# Patient Record
Sex: MALE | URBAN - METROPOLITAN AREA
[De-identification: names, ages, dates, MRNs, and addresses within clinical notes are randomized per-mention and may not be internally consistent; named-entity substitution may affect disease eponyms.]

---

## 2024-03-05 ENCOUNTER — HOME VISIT (OUTPATIENT)
Dept: POST ACUTE CARE | Facility: EXTERNAL LOCATION | Age: 79
End: 2024-03-05
Payer: MEDICARE

## 2024-03-05 DIAGNOSIS — J43.8 OTHER EMPHYSEMA (MULTI): ICD-10-CM

## 2024-03-05 DIAGNOSIS — E03.8 OTHER SPECIFIED HYPOTHYROIDISM: ICD-10-CM

## 2024-03-05 DIAGNOSIS — I10 PRIMARY HYPERTENSION: ICD-10-CM

## 2024-03-05 DIAGNOSIS — I48.0 PAROXYSMAL ATRIAL FIBRILLATION (MULTI): ICD-10-CM

## 2024-03-05 DIAGNOSIS — E78.2 MODERATE MIXED HYPERLIPIDEMIA NOT REQUIRING STATIN THERAPY: ICD-10-CM

## 2024-03-05 DIAGNOSIS — I27.20 PULMONARY HYPERTENSION (MULTI): ICD-10-CM

## 2024-03-05 DIAGNOSIS — D50.8 IRON DEFICIENCY ANEMIA SECONDARY TO INADEQUATE DIETARY IRON INTAKE: ICD-10-CM

## 2024-03-05 DIAGNOSIS — I25.10 CORONARY ARTERY DISEASE INVOLVING NATIVE CORONARY ARTERY OF NATIVE HEART WITHOUT ANGINA PECTORIS: ICD-10-CM

## 2024-03-05 DIAGNOSIS — D68.59 THROMBOPHILIA (MULTI): ICD-10-CM

## 2024-03-05 DIAGNOSIS — F01.A3 MILD VASCULAR DEMENTIA WITH MOOD DISTURBANCE (MULTI): ICD-10-CM

## 2024-03-05 DIAGNOSIS — I70.0 ATHEROSCLEROSIS OF ABDOMINAL AORTA (CMS-HCC): Primary | ICD-10-CM

## 2024-03-05 DIAGNOSIS — G47.33 OBSTRUCTIVE SLEEP APNEA: ICD-10-CM

## 2024-03-05 PROCEDURE — 99345 HOME/RES VST NEW HIGH MDM 75: CPT | Performed by: INTERNAL MEDICINE

## 2024-03-10 VITALS — SYSTOLIC BLOOD PRESSURE: 122 MMHG | DIASTOLIC BLOOD PRESSURE: 58 MMHG | WEIGHT: 218 LBS | HEART RATE: 61 BPM

## 2024-03-10 PROBLEM — I70.0 ATHEROSCLEROSIS OF ABDOMINAL AORTA (CMS-HCC): Status: ACTIVE | Noted: 2024-03-10

## 2024-03-10 PROBLEM — E03.8 OTHER SPECIFIED HYPOTHYROIDISM: Status: ACTIVE | Noted: 2024-03-10

## 2024-03-10 PROBLEM — K21.9 GASTROESOPHAGEAL REFLUX DISEASE WITHOUT ESOPHAGITIS: Status: ACTIVE | Noted: 2024-03-10

## 2024-03-10 PROBLEM — I27.20 PULMONARY HYPERTENSION (MULTI): Status: ACTIVE | Noted: 2024-03-10

## 2024-03-10 PROBLEM — E78.2 MODERATE MIXED HYPERLIPIDEMIA NOT REQUIRING STATIN THERAPY: Status: ACTIVE | Noted: 2024-03-10

## 2024-03-10 PROBLEM — I48.0 PAROXYSMAL ATRIAL FIBRILLATION (MULTI): Status: ACTIVE | Noted: 2024-03-10

## 2024-03-10 PROBLEM — F41.9 ANXIETY: Status: RESOLVED | Noted: 2024-03-10 | Resolved: 2024-03-10

## 2024-03-10 PROBLEM — E11.51: Status: ACTIVE | Noted: 2024-03-10

## 2024-03-10 PROBLEM — I70.0: Status: ACTIVE | Noted: 2024-03-10

## 2024-03-10 PROBLEM — J43.8 OTHER EMPHYSEMA (MULTI): Status: ACTIVE | Noted: 2024-03-10

## 2024-03-10 PROBLEM — F01.A3 MILD VASCULAR DEMENTIA WITH MOOD DISTURBANCE (MULTI): Status: ACTIVE | Noted: 2024-03-10

## 2024-03-10 PROBLEM — G47.33 OBSTRUCTIVE SLEEP APNEA: Status: ACTIVE | Noted: 2024-03-10

## 2024-03-10 PROBLEM — D50.8 IRON DEFICIENCY ANEMIA SECONDARY TO INADEQUATE DIETARY IRON INTAKE: Status: ACTIVE | Noted: 2024-03-10

## 2024-03-10 PROBLEM — I70.0: Status: RESOLVED | Noted: 2024-03-10 | Resolved: 2024-03-10

## 2024-03-10 PROBLEM — I10 PRIMARY HYPERTENSION: Status: ACTIVE | Noted: 2024-03-10

## 2024-03-10 PROBLEM — F32.4 MAJOR DEPRESSIVE DISORDER WITH SINGLE EPISODE, IN PARTIAL REMISSION (CMS-HCC): Status: RESOLVED | Noted: 2024-03-10 | Resolved: 2024-03-10

## 2024-03-10 PROBLEM — F41.9 ANXIETY: Status: ACTIVE | Noted: 2024-03-10

## 2024-03-10 PROBLEM — F32.4 MAJOR DEPRESSIVE DISORDER WITH SINGLE EPISODE, IN PARTIAL REMISSION (CMS-HCC): Status: ACTIVE | Noted: 2024-03-10

## 2024-03-10 PROBLEM — N40.0 BENIGN PROSTATIC HYPERPLASIA WITHOUT LOWER URINARY TRACT SYMPTOMS: Status: ACTIVE | Noted: 2024-03-10

## 2024-03-10 PROBLEM — D68.59 THROMBOPHILIA (MULTI): Status: ACTIVE | Noted: 2024-03-10

## 2024-03-10 PROBLEM — I25.10 CORONARY ARTERY DISEASE INVOLVING NATIVE CORONARY ARTERY OF NATIVE HEART WITHOUT ANGINA PECTORIS: Status: ACTIVE | Noted: 2024-03-10

## 2024-03-10 PROBLEM — I48.20 CHRONIC ATRIAL FIBRILLATION (MULTI): Status: ACTIVE | Noted: 2024-03-10

## 2024-03-10 PROBLEM — E11.51: Status: RESOLVED | Noted: 2024-03-10 | Resolved: 2024-03-10

## 2024-03-10 ASSESSMENT — ENCOUNTER SYMPTOMS
DIARRHEA: 0
UNEXPECTED WEIGHT CHANGE: 0
DIZZINESS: 0
DIFFICULTY URINATING: 0
SEIZURES: 0
ABDOMINAL PAIN: 0
APNEA: 0
NAUSEA: 0
HEMATURIA: 0
POLYDIPSIA: 0
CONSTIPATION: 0
TROUBLE SWALLOWING: 0
WHEEZING: 0
AGITATION: 0
WEAKNESS: 0
APPETITE CHANGE: 0
ARTHRALGIAS: 1
BRUISES/BLEEDS EASILY: 0
CHOKING: 0
COUGH: 0
DYSPHORIC MOOD: 0
ACTIVITY CHANGE: 0
CONFUSION: 0
HEADACHES: 0
WOUND: 0
SLEEP DISTURBANCE: 0
PALPITATIONS: 0
FEVER: 0
SHORTNESS OF BREATH: 0

## 2024-03-10 NOTE — PROGRESS NOTES
"Initial Visit Domo Chen  1945 age 78     [facility]     Seen at home in [place] apartment 3/5/2024     Primary hospital [facility]   Sees numerous [facility] specialist  ENT for asymmetrical hearing loss  Ophthalmology  Cardiology  Pulmonary  Gerontology for memory clinic  Cc: establish care. Patient reports his plan is to continue primary care through [facility].  Family has requested through facility that primary care be established with me given ability to complete home visits at the facility          TODAY     No complaints     No concerns since admission     No nursing concerns     PMH     s/p bilateral rib fractures 8-9     Dextroscoliosis of the thoracic spine     Vit d deficiency     h/o rhabdo     Copd   CINTHYA  H/o colon ca s/p hemicolectomy w end to end anastamosis  Severe pulmonary hypertension  Chronic respiratory failure on 3L O2 - only wears qhs     Afib w secondary thrombophilia     Htn     Hld     Cardiomegaly     Atherosclerosis of the aorta     CAD     h/o colon ca     GERD     Hypothyroidsm     Major depression moderate recurrent     Generalized anxiety disorder     Vascular dementia     Anemia fe deficiency     B12 deficiency     BPH     Chronic allergic rhinitis     NKDA     Omeprazole     Mvi     Guaifenesin     Fe     Rosuvasatin     Tamsulosin     Metoprolol     Fluticasone-salmeterol     Xarelto     Fluticasone nasal     Umeclidinium bromide     Lasix 20     Cetirizine     Asa     B12     Vit D     Alb prn     Loperamide prn      SOC  NOK is son Lawrence  Recent issues with non adherence to meds at home  Previously living home alone  Quit tob (cig) occ cigar use  No etoh, \"but has some whiskey in my fridge for guests\"    ROS   Review of Systems   Constitutional:  Negative for activity change, appetite change, fever and unexpected weight change.   HENT:  Negative for congestion, dental problem and trouble swallowing.    Eyes:  Negative for visual disturbance. "   Respiratory:  Negative for apnea, cough, choking, shortness of breath and wheezing.         Chart hx w dx resp failure. He reports only wearing O2 at night.    Cardiovascular:  Negative for chest pain, palpitations and leg swelling.   Gastrointestinal:  Negative for abdominal pain, constipation, diarrhea and nausea.   Endocrine: Negative for cold intolerance, polydipsia and polyuria.   Genitourinary:  Negative for difficulty urinating and hematuria.   Musculoskeletal:  Positive for arthralgias and gait problem.        One leg shorter than the other after childhood traumatic injury     Skin:  Negative for rash and wound.   Allergic/Immunologic: Negative for environmental allergies and food allergies.   Neurological:  Negative for dizziness, seizures, syncope, weakness and headaches.   Hematological:  Does not bruise/bleed easily.   Psychiatric/Behavioral:  Negative for agitation, confusion, dysphoric mood and sleep disturbance.            Physical Exam  Vitals and nursing note reviewed.   Constitutional:       Appearance: Normal appearance.   HENT:      Head: Normocephalic and atraumatic.      Mouth/Throat:      Mouth: Mucous membranes are moist.      Pharynx: No oropharyngeal exudate.   Eyes:      General: No scleral icterus.     Extraocular Movements: Extraocular movements intact.      Pupils: Pupils are equal, round, and reactive to light.   Cardiovascular:      Rate and Rhythm: Normal rate and regular rhythm.      Pulses: Normal pulses.      Heart sounds: No murmur heard.  Pulmonary:      Effort: Pulmonary effort is normal.      Breath sounds: No wheezing.      Comments: Diminished but clear throughout  Abdominal:      General: Bowel sounds are normal.      Palpations: Abdomen is soft.      Tenderness: There is no abdominal tenderness.   Musculoskeletal:      Cervical back: Normal range of motion and neck supple.      Comments: 1+ edema to bilateral shins. Pitting. Skin thin atrophic hairless with venous stasis  dermatitis. Hypersensative. No open lesions bilateral feet. Ataxic gait with obvious limp   Skin:     General: Skin is warm and dry.   Neurological:      Mental Status: He is alert and oriented to person, place, and time.   Psychiatric:         Mood and Affect: Mood normal.         Speech: Speech is tangential.         Behavior: Behavior normal.      Comments: Repetitive in history provided. Needed to redirect often to answer question at hand. Pleasant and cooperative. Struggled to  on social cues; continued to tell story of his motorcycle as I had ended visit and was walking away...              LABS   Reviewed in clinisync  February 16, 2024  MRI head done for asymmetric hearing loss  Complete opacification of left maxillary sinus  Scattered T2 hyperintensities in the cerebral white matter consistent with mild chronic microvascular angiopathy and mild brain parenchymal volume loss    January 17, 2024  WBC 3  Hemoglobin 10.7  Platelet 113  Glucose 97  Sodium 135  BUN 13  EGFR 91 creatinine 0.81  Magnesium 1.9  Phos 3.1  January 15, 2024 chest x-ray  No cardiopulmonary disease  February 2021 hemoglobin A1c 5.2  July 2022 PSA 0.64  January 2024 CT chest abdomen pelvis  Ascending aortic dilatation 4.1 cm  Pulmonary trunk dilatation 3.4 cm  Centrilobular emphysema  Gallbladder surgically absent  Postsurgical changes consistent with right hemicolectomy with ileocolic anastomosis  Gynecomastia  January 14, 2024 echocardiogram  Normal LV systolic function  EF 60%  Mild to moderate aortic fibrocalcific nonstenotic changes  Moderate pulmonary hypertension            A/P    78-year-old white male recently admitted to [facility] for assistance with medical supervision and medication management.  Appears medically stable and symptom-free at this time    HEENT  1 asymmetric hearing loss with recent imaging showing chronic mastoiditis.  Follow-up with ENT.  Continue nasal  fluticasone    Cardiovascular/pulmonary  1 atherosclerosis of abdominal aorta with known aneurysm.  Stable continue risk factor modification due for repeat imaging in 12 months (January 2025) if clinically appropriate  2 atrial fibrillation paroxysmal.  Clinically sinus during today's exam.  Rate controlled.  Tolerating anticoagulation.  Monitor for signs and symptoms of bleeding.  Continue metoprolol for rate control and Xarelto for secondary thrombophilia associated with atrial fibrillation.  [facility] as indicated  3 hypertension stable with current medications (he is on Toprol for rate control and Lasix for pulmonary hypertension but this combination has provided good control of his blood pressure) continue  4.  Mixed hyperlipidemia stable with rosuvastatin.  Tolerating well.  Continue January 2024 LDL 34.  HDL 26.  Continue  5 coronary artery disease stable chest pain-free.  Continue risk factor modification  6.  Obstructive sleep apnea (untreated currently now only on nocturnal O2) with moderate pulmonary hypertension.  Continue nocturnal O2.  Continue Lasix and monitor fluid status.  Has follow-up with pulmonary May 17.  Reviewed [facility] pulmonary notes.  Appears he was referred for CPAP machine roughly 2014 and declined treatment at that time.  Now agreed to titration study.  Does not appear to have been completed.  Follow-up pulmonary.  Consider repeat in-home testing with consideration of AutoPap if appropriate.    7 chronic obstructive pulmonary disease.  June 2021 PFTs with FEV1 to FVC ratio 54% predicted.  Symptoms reported to be well-controlled at this time continue guaifenesin, Advair, umeclidinium.    Endocrine  1 hypothyroidism clinically euthyroid with current replacement.  Last TSH 0.89 December 2022.  Repeat  2 chart includes diagnosis of diabetes mellitus with atherosclerosis.  Upon review of [facility] chart I see no hemoglobin A1c which would qualify him for this diagnosis.  He is  not currently on any medications for diabetes.  Will remove from diagnosis list and monitor hemoglobin A1c    Neuro/psych  1 vascular dementia.  Now followed by [facility] memory clinic.  Known change in ADLs with son now managing health concerns and finances. Noted need for admission to higher level of care in order to meet care needs.  Of note there are some mention of diagnosis of depression and anxiety throughout chart.  He is not currently on any medications for these nor do I see any accompanying documentation or reference to mood disturbance through chart.  Monitor for these conditions.    Heme-onc  1 history of colon cancer.  Status post hemicolectomy with end-to-end anastomosis.  Diagnosed 2023.  Status post laparoscopic right colectomy with lysis of adhesions with Dr. Nuñez June 1, 2023.  Due for repeat colonoscopy 1 year (June 2024)  2 iron deficiency anemia.  Hemoglobin 10 monitor as indicated.  Continue iron supplementation    Greater than 90 minutes was spent reviewing multiple electronic medical records including [facility], clinic sink, facility chart.  Discussions with nursing administrator and nursing staff.    Full code  Qian Carmen MD